# Patient Record
Sex: MALE | Race: OTHER | Employment: FULL TIME | URBAN - METROPOLITAN AREA
[De-identification: names, ages, dates, MRNs, and addresses within clinical notes are randomized per-mention and may not be internally consistent; named-entity substitution may affect disease eponyms.]

---

## 2024-11-11 ENCOUNTER — HOSPITAL ENCOUNTER (EMERGENCY)
Facility: HOSPITAL | Age: 24
Discharge: HOME | End: 2024-11-11
Attending: EMERGENCY MEDICINE

## 2024-11-11 ENCOUNTER — APPOINTMENT (OUTPATIENT)
Facility: HOSPITAL | Age: 24
End: 2024-11-11

## 2024-11-11 VITALS
WEIGHT: 250 LBS | SYSTOLIC BLOOD PRESSURE: 130 MMHG | HEART RATE: 65 BPM | HEIGHT: 69 IN | DIASTOLIC BLOOD PRESSURE: 76 MMHG | TEMPERATURE: 97.9 F | BODY MASS INDEX: 37.03 KG/M2 | RESPIRATION RATE: 18 BRPM | OXYGEN SATURATION: 100 %

## 2024-11-11 DIAGNOSIS — M79.671 RIGHT FOOT PAIN: Primary | ICD-10-CM

## 2024-11-11 PROCEDURE — 73630 X-RAY EXAM OF FOOT: CPT

## 2024-11-11 PROCEDURE — 96372 THER/PROPH/DIAG INJ SC/IM: CPT

## 2024-11-11 PROCEDURE — 99284 EMERGENCY DEPT VISIT MOD MDM: CPT

## 2024-11-11 PROCEDURE — 6360000002 HC RX W HCPCS

## 2024-11-11 RX ORDER — KETOROLAC TROMETHAMINE 30 MG/ML
30 INJECTION, SOLUTION INTRAMUSCULAR; INTRAVENOUS
Status: COMPLETED | OUTPATIENT
Start: 2024-11-11 | End: 2024-11-11

## 2024-11-11 RX ADMIN — KETOROLAC TROMETHAMINE 30 MG: 30 INJECTION, SOLUTION INTRAMUSCULAR at 09:39

## 2024-11-11 NOTE — ED PROVIDER NOTES
[facility] [place] EMERGENCY DEPT  EMERGENCY DEPARTMENT ENCOUNTER      Pt Name: Kishore Ventura  MRN: [id number]  Birthdate 2000  Date of evaluation: 11/11/2024  Provider: Adiel Stone PA-C    CHIEF COMPLAINT       Chief Complaint   Patient presents with    Foot Swelling         HISTORY OF PRESENT ILLNESS    Patient is an 24 y.o. male with no significant past medical history who presents emergency room with reports of right top of the foot swelling and pain since Friday.  Patient reports no known injury, but does report working 12 hours a day on his feet.  Patient reports pain is only whenever he is walking or putting pressure on the foot.  Patient reports this is never happened in the past.  No previous injury or fracture.  Patient reports taking ibuprofen yesterday with some relief. Patient denies chest pain, shortness of breath, abdominal pain, urinary symptoms, nausea or vomiting, diarrhea or constipation, headache, dizziness, lightheadedness, fever or chills.           Nursing Notes were reviewed.    REVIEW OF SYSTEMS       Review of Systems      PAST MEDICAL HISTORY   No past medical history on file.      SURGICAL HISTORY     No past surgical history on file.      CURRENT MEDICATIONS       Previous Medications    No medications on file       ALLERGIES     Patient has no known allergies.    FAMILY HISTORY     No family history on file.       SOCIAL HISTORY       Social History     Socioeconomic History    Marital status: Single       PHYSICAL EXAM       Physical Exam  Vitals reviewed.   Constitutional:       General: He is not in acute distress.     Appearance: Normal appearance. He is not ill-appearing or toxic-appearing.   HENT:      Head: Normocephalic and atraumatic.      Nose: Nose normal.      Mouth/Throat:      Mouth: Mucous membranes are moist.      Pharynx: Oropharynx is clear.   Eyes:      Extraocular Movements: Extraocular movements intact.      Conjunctiva/sclera: Conjunctivae normal.

## 2024-11-11 NOTE — ED TRIAGE NOTES
#[id number]    Patient arrives to ed via pov. Pt c/o swelling to right foot noticed on Friday. pt denies known injury, sts he is unsure if the swelling is due to working 12 hours on his feet. Pt sts he only has pain in foot when walking. Pt sts he took ibuprofen for pain yesterday with some relief. Pt denies PMH.    Speaking Coherently

## 2024-11-11 NOTE — DISCHARGE INSTRUCTIONS
Discussed visit today. Rest, Ice, compression with ACE bandage, and elevation at home. Continue to take tylenol and motrin at home for the pain. Follow-up with [facility] as needed for worsening of symptoms.     Return to the ER with any worsening of symptoms.     Visita discutida hoy. Reposo, Hielo, compresión con vendaje ACE y elevación en casa. Continúe tomando tylenol y motrin en casa para el dolor. Lucina un seguimiento con [facility] según sea necesario si los síntomas empeoran.     Regrese a la traci de emergencias si los síntomas empeoran.